# Patient Record
Sex: MALE | Race: WHITE | Employment: OTHER | URBAN - METROPOLITAN AREA
[De-identification: names, ages, dates, MRNs, and addresses within clinical notes are randomized per-mention and may not be internally consistent; named-entity substitution may affect disease eponyms.]

---

## 2024-07-19 ENCOUNTER — HOSPITAL ENCOUNTER (EMERGENCY)
Age: 44
Discharge: HOME | End: 2024-07-19
Payer: MEDICARE

## 2024-07-19 VITALS
OXYGEN SATURATION: 98 % | TEMPERATURE: 98.1 F | SYSTOLIC BLOOD PRESSURE: 139 MMHG | DIASTOLIC BLOOD PRESSURE: 71 MMHG | HEART RATE: 88 BPM | WEIGHT: 220 LBS | RESPIRATION RATE: 22 BRPM

## 2024-07-19 DIAGNOSIS — Z43.3 COLOSTOMY CARE (HCC): Primary | ICD-10-CM

## 2024-07-19 DIAGNOSIS — R23.8 SKIN IRRITATION: ICD-10-CM

## 2024-07-19 DIAGNOSIS — L03.311 ABDOMINAL WALL CELLULITIS: ICD-10-CM

## 2024-07-19 PROCEDURE — 99283 EMERGENCY DEPT VISIT LOW MDM: CPT

## 2024-07-19 RX ORDER — CLONAZEPAM 0.5 MG/1
0.5 TABLET ORAL DAILY
COMMUNITY

## 2024-07-19 RX ORDER — ACETAMINOPHEN 325 MG/1
975 TABLET ORAL EVERY 8 HOURS PRN
COMMUNITY
Start: 2023-10-26

## 2024-07-19 RX ORDER — TAMSULOSIN HYDROCHLORIDE 0.4 MG/1
0.4 CAPSULE ORAL DAILY
COMMUNITY
Start: 2024-03-14

## 2024-07-19 RX ORDER — AMOXICILLIN 500 MG/1
CAPSULE ORAL
COMMUNITY
End: 2024-07-19

## 2024-07-19 RX ORDER — OLANZAPINE 10 MG/1
300 TABLET ORAL NIGHTLY
COMMUNITY

## 2024-07-19 RX ORDER — CEPHALEXIN 500 MG/1
500 CAPSULE ORAL 2 TIMES DAILY
Qty: 14 CAPSULE | Refills: 0 | Status: SHIPPED | OUTPATIENT
Start: 2024-07-19 | End: 2024-07-26

## 2024-07-19 RX ORDER — POTASSIUM CHLORIDE 750 MG/1
1 TABLET, FILM COATED, EXTENDED RELEASE ORAL 2 TIMES DAILY
COMMUNITY
Start: 2023-08-07

## 2024-07-19 ASSESSMENT — ENCOUNTER SYMPTOMS
DIARRHEA: 0
COUGH: 0
VOMITING: 0
BLOOD IN STOOL: 0
COLOR CHANGE: 1
SHORTNESS OF BREATH: 0
NAUSEA: 0
CONSTIPATION: 0
ABDOMINAL PAIN: 0
ABDOMINAL DISTENTION: 0

## 2024-07-19 NOTE — ED PROVIDER NOTES
[facility] EMERGENCY DEPARTMENT  EMERGENCY DEPARTMENT ENCOUNTER        Pt Name: Vernon Corrales  MRN: [id number]  Birthdate 1980  Date of evaluation: 7/19/2024  Provider: Abhinav Sosa PA-C  PCP: Cecil Dos Santos MD  Note Started: 7:54 PM EDT 7/19/24      MANDEEP. I have evaluated this patient.        CHIEF COMPLAINT       Chief Complaint   Patient presents with    OTHER     Pt via caretaker, started leaking around ostomy bag and skin is now irritated       HISTORY OF PRESENT ILLNESS: 1 or more Elements     History from : Caregiver    Limitations to history : poor historian/predominantly nonverbal    Vernon Corrales is a 44 y.o. male who presents to the emergency department with caregivers at bedside.  Per caregiver, for the past few days, the skin of his abdomen around the colostomy site has been irritated because stool is leaking around the adhesive of the ostomy bag.  They are requesting that the colostomy bag be exchanged with a better adhesive.  Patient is lying comfortably and does not appear to be in any acute distress.  He has not been complaining of any pain.  The stool consistency is the same.  There has not been any bloody or black stool.  He has no fever or chills.    Nursing Notes were all reviewed and agreed with or any disagreements were addressed in the HPI.    REVIEW OF SYSTEMS :      Review of Systems   Constitutional:  Negative for chills and fever.   Respiratory:  Negative for cough and shortness of breath.    Cardiovascular:  Negative for chest pain.   Gastrointestinal:  Negative for abdominal distention, abdominal pain, blood in stool, constipation, diarrhea, nausea and vomiting.   Musculoskeletal:  Negative for neck stiffness.   Skin:  Positive for color change. Negative for pallor.   Neurological:  Negative for seizures and syncope.   Psychiatric/Behavioral:  Negative for agitation and behavioral problems.    Unable to assess other ROS due to chronic verbal  abscess, kidney stone, urosepsis, fistula, intussusception, pyloric stenosis, or other concerning pathology.    Appropriate for outpatient management        I am the Primary Clinician of Record.    FINAL IMPRESSION      1. Colostomy care (HCC)    2. Skin irritation    3. Abdominal wall cellulitis          DISPOSITION/PLAN     DISPOSITION Decision To Discharge 07/19/2024 08:01:37 PM      PATIENT REFERRED TO:  Cecil Dos Santos MD  [address]  [facility] [address]  [phone]          [facility] Emergency Department  [address] [address]  [phone]          DISCHARGE MEDICATIONS:  Discharge Medication List as of 7/19/2024  7:55 PM        START taking these medications    Details   cephALEXin (KEFLEX) 500 MG capsule Take 1 capsule by mouth 2 times daily for 7 days, Disp-14 capsule, R-0Print             DISCONTINUED MEDICATIONS:  Discharge Medication List as of 7/19/2024  7:55 PM        STOP taking these medications       amoxicillin (AMOXIL) 500 MG capsule Comments:   Reason for Stopping:                      (Please note that portions of this note were completed with a voice recognition program.  Efforts were made to edit the dictations but occasionally words are mis-transcribed.)    Abhinav Sosa PA-C (electronically signed)            Abhinav Sosa PA-C  07/19/24 2032

## 2024-07-19 NOTE — ED NOTES
Colostomy bag changed, skin excoriated around stoma. Educated caregiver to inform nurse/wound provider. Informed her to f/u with PCP and to keep area dry and clean. Discharge instructions reviewed with caregiver.